# Patient Record
Sex: MALE | Race: WHITE | Employment: PART TIME | URBAN - METROPOLITAN AREA
[De-identification: names, ages, dates, MRNs, and addresses within clinical notes are randomized per-mention and may not be internally consistent; named-entity substitution may affect disease eponyms.]

---

## 2019-02-28 PROBLEM — R22.1 NECK MASS: Status: ACTIVE | Noted: 2019-02-28

## 2019-03-08 RX ORDER — SODIUM CHLORIDE 0.9 % (FLUSH) 0.9 %
5-40 SYRINGE (ML) INJECTION EVERY 8 HOURS
Status: CANCELLED | OUTPATIENT
Start: 2019-03-08

## 2019-03-08 RX ORDER — SODIUM CHLORIDE 0.9 % (FLUSH) 0.9 %
5-40 SYRINGE (ML) INJECTION AS NEEDED
Status: CANCELLED | OUTPATIENT
Start: 2019-03-08

## 2020-11-19 RX ORDER — SODIUM CHLORIDE 0.9 % (FLUSH) 0.9 %
5-40 SYRINGE (ML) INJECTION AS NEEDED
Status: CANCELLED | OUTPATIENT
Start: 2020-11-19

## 2020-11-19 RX ORDER — SODIUM CHLORIDE 0.9 % (FLUSH) 0.9 %
5-40 SYRINGE (ML) INJECTION EVERY 8 HOURS
Status: CANCELLED | OUTPATIENT
Start: 2020-11-19

## 2020-11-30 ENCOUNTER — ANESTHESIA EVENT (OUTPATIENT)
Dept: SURGERY | Age: 30
End: 2020-11-30
Payer: COMMERCIAL

## 2020-12-01 ENCOUNTER — ANESTHESIA (OUTPATIENT)
Dept: SURGERY | Age: 30
End: 2020-12-01
Payer: COMMERCIAL

## 2020-12-01 ENCOUNTER — HOSPITAL ENCOUNTER (OUTPATIENT)
Age: 30
Setting detail: OUTPATIENT SURGERY
Discharge: HOME | End: 2020-12-01
Attending: SURGERY | Admitting: SURGERY
Payer: COMMERCIAL

## 2020-12-01 VITALS
BODY MASS INDEX: 25.51 KG/M2 | WEIGHT: 172.2 LBS | SYSTOLIC BLOOD PRESSURE: 141 MMHG | HEART RATE: 76 BPM | TEMPERATURE: 97.3 F | OXYGEN SATURATION: 98 % | RESPIRATION RATE: 14 BRPM | DIASTOLIC BLOOD PRESSURE: 86 MMHG | HEIGHT: 69 IN

## 2020-12-01 DIAGNOSIS — R22.1 NECK MASS: ICD-10-CM

## 2020-12-01 PROCEDURE — 76210000020 HC REC RM PH II FIRST 0.5 HR: Performed by: SURGERY

## 2020-12-01 PROCEDURE — 74011250636 HC RX REV CODE- 250/636: Performed by: NURSE ANESTHETIST, CERTIFIED REGISTERED

## 2020-12-01 PROCEDURE — 76010000160 HC OR TIME 0.5 TO 1 HR INTENSV-TIER 1: Performed by: SURGERY

## 2020-12-01 PROCEDURE — 77030019908 HC STETH ESOPH SIMS -A: Performed by: ANESTHESIOLOGY

## 2020-12-01 PROCEDURE — 77030039425 HC BLD LARYNG TRULITE DISP TELE -A: Performed by: REGISTERED NURSE

## 2020-12-01 PROCEDURE — 74011250636 HC RX REV CODE- 250/636: Performed by: ANESTHESIOLOGY

## 2020-12-01 PROCEDURE — 76210000063 HC OR PH I REC FIRST 0.5 HR: Performed by: SURGERY

## 2020-12-01 PROCEDURE — 76060000032 HC ANESTHESIA 0.5 TO 1 HR: Performed by: SURGERY

## 2020-12-01 PROCEDURE — 74011000250 HC RX REV CODE- 250: Performed by: NURSE ANESTHETIST, CERTIFIED REGISTERED

## 2020-12-01 PROCEDURE — 88305 TISSUE EXAM BY PATHOLOGIST: CPT

## 2020-12-01 PROCEDURE — 21552 EXC NECK LES SC 3 CM/>: CPT | Performed by: SURGERY

## 2020-12-01 PROCEDURE — 74011250636 HC RX REV CODE- 250/636: Performed by: SURGERY

## 2020-12-01 PROCEDURE — 74011250636 HC RX REV CODE- 250/636: Performed by: REGISTERED NURSE

## 2020-12-01 PROCEDURE — 77030040361 HC SLV COMPR DVT MDII -B: Performed by: SURGERY

## 2020-12-01 PROCEDURE — 77030037088 HC TUBE ENDOTRACH ORAL NSL COVD-A: Performed by: REGISTERED NURSE

## 2020-12-01 PROCEDURE — 2709999900 HC NON-CHARGEABLE SUPPLY: Performed by: SURGERY

## 2020-12-01 PROCEDURE — 77030031139 HC SUT VCRL2 J&J -A: Performed by: SURGERY

## 2020-12-01 RX ORDER — FENTANYL CITRATE 50 UG/ML
100 INJECTION, SOLUTION INTRAMUSCULAR; INTRAVENOUS ONCE
Status: DISCONTINUED | OUTPATIENT
Start: 2020-12-01 | End: 2020-12-01 | Stop reason: HOSPADM

## 2020-12-01 RX ORDER — OXYCODONE HYDROCHLORIDE 5 MG/1
5 TABLET ORAL
Status: DISCONTINUED | OUTPATIENT
Start: 2020-12-01 | End: 2020-12-01 | Stop reason: HOSPADM

## 2020-12-01 RX ORDER — MIDAZOLAM HYDROCHLORIDE 1 MG/ML
2 INJECTION, SOLUTION INTRAMUSCULAR; INTRAVENOUS
Status: DISCONTINUED | OUTPATIENT
Start: 2020-12-01 | End: 2020-12-01 | Stop reason: HOSPADM

## 2020-12-01 RX ORDER — SODIUM CHLORIDE, SODIUM LACTATE, POTASSIUM CHLORIDE, CALCIUM CHLORIDE 600; 310; 30; 20 MG/100ML; MG/100ML; MG/100ML; MG/100ML
100 INJECTION, SOLUTION INTRAVENOUS CONTINUOUS
Status: DISCONTINUED | OUTPATIENT
Start: 2020-12-01 | End: 2020-12-01 | Stop reason: HOSPADM

## 2020-12-01 RX ORDER — SODIUM CHLORIDE 0.9 % (FLUSH) 0.9 %
5-40 SYRINGE (ML) INJECTION AS NEEDED
Status: DISCONTINUED | OUTPATIENT
Start: 2020-12-01 | End: 2020-12-01 | Stop reason: HOSPADM

## 2020-12-01 RX ORDER — NALOXONE HYDROCHLORIDE 0.4 MG/ML
0.1 INJECTION, SOLUTION INTRAMUSCULAR; INTRAVENOUS; SUBCUTANEOUS AS NEEDED
Status: DISCONTINUED | OUTPATIENT
Start: 2020-12-01 | End: 2020-12-01 | Stop reason: HOSPADM

## 2020-12-01 RX ORDER — ROCURONIUM BROMIDE 10 MG/ML
INJECTION, SOLUTION INTRAVENOUS AS NEEDED
Status: DISCONTINUED | OUTPATIENT
Start: 2020-12-01 | End: 2020-12-01 | Stop reason: HOSPADM

## 2020-12-01 RX ORDER — DIPHENHYDRAMINE HYDROCHLORIDE 50 MG/ML
12.5 INJECTION, SOLUTION INTRAMUSCULAR; INTRAVENOUS
Status: DISCONTINUED | OUTPATIENT
Start: 2020-12-01 | End: 2020-12-01 | Stop reason: HOSPADM

## 2020-12-01 RX ORDER — LIDOCAINE HYDROCHLORIDE 10 MG/ML
0.1 INJECTION INFILTRATION; PERINEURAL AS NEEDED
Status: DISCONTINUED | OUTPATIENT
Start: 2020-12-01 | End: 2020-12-01 | Stop reason: HOSPADM

## 2020-12-01 RX ORDER — SODIUM CHLORIDE 0.9 % (FLUSH) 0.9 %
5-40 SYRINGE (ML) INJECTION EVERY 8 HOURS
Status: DISCONTINUED | OUTPATIENT
Start: 2020-12-01 | End: 2020-12-01 | Stop reason: HOSPADM

## 2020-12-01 RX ORDER — MIDAZOLAM HYDROCHLORIDE 1 MG/ML
2 INJECTION, SOLUTION INTRAMUSCULAR; INTRAVENOUS ONCE
Status: DISCONTINUED | OUTPATIENT
Start: 2020-12-01 | End: 2020-12-01 | Stop reason: HOSPADM

## 2020-12-01 RX ORDER — HYDROMORPHONE HYDROCHLORIDE 2 MG/ML
0.5 INJECTION, SOLUTION INTRAMUSCULAR; INTRAVENOUS; SUBCUTANEOUS
Status: DISCONTINUED | OUTPATIENT
Start: 2020-12-01 | End: 2020-12-01 | Stop reason: HOSPADM

## 2020-12-01 RX ORDER — LIDOCAINE HYDROCHLORIDE 20 MG/ML
INJECTION, SOLUTION EPIDURAL; INFILTRATION; INTRACAUDAL; PERINEURAL AS NEEDED
Status: DISCONTINUED | OUTPATIENT
Start: 2020-12-01 | End: 2020-12-01 | Stop reason: HOSPADM

## 2020-12-01 RX ORDER — FENTANYL CITRATE 50 UG/ML
INJECTION, SOLUTION INTRAMUSCULAR; INTRAVENOUS AS NEEDED
Status: DISCONTINUED | OUTPATIENT
Start: 2020-12-01 | End: 2020-12-01 | Stop reason: HOSPADM

## 2020-12-01 RX ORDER — DEXAMETHASONE SODIUM PHOSPHATE 4 MG/ML
INJECTION, SOLUTION INTRA-ARTICULAR; INTRALESIONAL; INTRAMUSCULAR; INTRAVENOUS; SOFT TISSUE AS NEEDED
Status: DISCONTINUED | OUTPATIENT
Start: 2020-12-01 | End: 2020-12-01 | Stop reason: HOSPADM

## 2020-12-01 RX ORDER — ONDANSETRON 2 MG/ML
INJECTION INTRAMUSCULAR; INTRAVENOUS AS NEEDED
Status: DISCONTINUED | OUTPATIENT
Start: 2020-12-01 | End: 2020-12-01 | Stop reason: HOSPADM

## 2020-12-01 RX ORDER — ALBUTEROL SULFATE 0.83 MG/ML
2.5 SOLUTION RESPIRATORY (INHALATION) AS NEEDED
Status: DISCONTINUED | OUTPATIENT
Start: 2020-12-01 | End: 2020-12-01 | Stop reason: HOSPADM

## 2020-12-01 RX ORDER — OXYCODONE HYDROCHLORIDE 5 MG/1
10 TABLET ORAL
Status: DISCONTINUED | OUTPATIENT
Start: 2020-12-01 | End: 2020-12-01 | Stop reason: HOSPADM

## 2020-12-01 RX ORDER — ONDANSETRON 2 MG/ML
4 INJECTION INTRAMUSCULAR; INTRAVENOUS ONCE
Status: DISCONTINUED | OUTPATIENT
Start: 2020-12-01 | End: 2020-12-01 | Stop reason: HOSPADM

## 2020-12-01 RX ORDER — SUCCINYLCHOLINE CHLORIDE 20 MG/ML
INJECTION INTRAMUSCULAR; INTRAVENOUS AS NEEDED
Status: DISCONTINUED | OUTPATIENT
Start: 2020-12-01 | End: 2020-12-01 | Stop reason: HOSPADM

## 2020-12-01 RX ORDER — PROPOFOL 10 MG/ML
INJECTION, EMULSION INTRAVENOUS AS NEEDED
Status: DISCONTINUED | OUTPATIENT
Start: 2020-12-01 | End: 2020-12-01 | Stop reason: HOSPADM

## 2020-12-01 RX ORDER — CEFAZOLIN SODIUM/WATER 2 G/20 ML
2 SYRINGE (ML) INTRAVENOUS ONCE
Status: COMPLETED | OUTPATIENT
Start: 2020-12-01 | End: 2020-12-01

## 2020-12-01 RX ADMIN — Medication 2 G: at 09:56

## 2020-12-01 RX ADMIN — PROPOFOL 200 MG: 10 INJECTION, EMULSION INTRAVENOUS at 09:43

## 2020-12-01 RX ADMIN — SUCCINYLCHOLINE CHLORIDE 170 MG: 20 INJECTION, SOLUTION INTRAMUSCULAR; INTRAVENOUS at 09:44

## 2020-12-01 RX ADMIN — SODIUM CHLORIDE, SODIUM LACTATE, POTASSIUM CHLORIDE, AND CALCIUM CHLORIDE 100 ML/HR: 600; 310; 30; 20 INJECTION, SOLUTION INTRAVENOUS at 08:33

## 2020-12-01 RX ADMIN — ROCURONIUM BROMIDE 5 MG: 10 INJECTION, SOLUTION INTRAVENOUS at 09:43

## 2020-12-01 RX ADMIN — FENTANYL CITRATE 100 MCG: 50 INJECTION INTRAMUSCULAR; INTRAVENOUS at 09:43

## 2020-12-01 RX ADMIN — ONDANSETRON 4 MG: 2 INJECTION INTRAMUSCULAR; INTRAVENOUS at 10:10

## 2020-12-01 RX ADMIN — DEXAMETHASONE SODIUM PHOSPHATE 4 MG: 4 INJECTION, SOLUTION INTRAMUSCULAR; INTRAVENOUS at 09:58

## 2020-12-01 RX ADMIN — LIDOCAINE HYDROCHLORIDE 50 MG: 20 INJECTION, SOLUTION EPIDURAL; INFILTRATION; INTRACAUDAL; PERINEURAL at 09:43

## 2020-12-01 NOTE — DISCHARGE INSTRUCTIONS
Leave your incisional dressing alone until follow-up visit. Try to keep incision as dry as possible to lower risk of infection. No driving until you are off pain meds for 24hrs and have no pain with movements associated with driving. Pain prescription (Norco) on chart for patient to use as needed for pain  Follow-up with Dr Kannan Lewis nurse practitioner Eda Bassett NP or Bill Woodruff NP) in 9 days on a Thursday. Then see Dr Sabrina Sellers as needed after that visit. [address], [address]  (Call for an appt time unless one is already made for you by discharge nurse which is preferred -->[phone]-->option 1)      Soft Tissue Mass or Cyst Excision    Activity  · As tolerated and as directed by your doctor. · Bathe or shower as directed by your doctor    Diet  · Clear liquids until no nausea or vomiting; then light diet for hte first day. · Advance to regular diet on the second day unless your doctor orders otherwise. · If nausea and vomiting continue, call your doctor. Pain  · Take pain medication as directed by your doctor. · Call your doctor if your pain is NOT relieved by medication. · DO NOT take aspirin or blood thinners until directed by your doctor. If you have any problems or concerns, call your doctor as needed or if you experience;  · Excessive bleeding that does not stop after holding mild pressure over the area for several minutes. · Temperature of 101 degrees Fahrenheit or above. · Redness, excessive swelling or bruising, and/or green or yellow, smelly discharge from incision.     After general anesthesia or intravenous sedation, for 24 hours or while taking prescription Narcotics:  · Limit your activities  · A responsible adult needs to be with you for the next 24 hours  · Do not drive and operate hazardous machinery  · Do not make important personal or business decisions  · Do not drink alcoholic beverages  · If you have not urinated within 8 hours after discharge, and you are experiencing discomfort from urinary retention, please go to the nearest ED. · If you have sleep apnea and have a CPAP machine, please use it for all naps and sleeping. · Please use caution when taking narcotics and any of your home medications that may cause drowsiness. *  Please give a list of your current medications to your Primary Care Provider. *  Please update this list whenever your medications are discontinued, doses are      changed, or new medications (including over-the-counter products) are added. *  Please carry medication information at all times in case of emergency situations. These are general instructions for a healthy lifestyle:  No smoking/ No tobacco products/ Avoid exposure to second hand smoke  Surgeon General's Warning:  Quitting smoking now greatly reduces serious risk to your health. Obesity, smoking, and sedentary lifestyle greatly increases your risk for illness  A healthy diet, regular physical exercise & weight monitoring are important for maintaining a healthy lifestyle    You may be retaining fluid if you have a history of heart failure or if you experience any of the following symptoms:  Weight gain of 3 pounds or more overnight or 5 pounds in a week, increased swelling in our hands or feet or shortness of breath while lying flat in bed. Please call your doctor as soon as you notice any of these symptoms; do not wait until your next office visit. After general anesthesia or intravenous sedation, for 24 hours or while taking prescription Narcotics:  · Limit your activities  · A responsible adult needs to be with you for the next 24 hours  · Do not drive and operate hazardous machinery  · Do not make important personal or business decisions  · Do not drink alcoholic beverages  · If you have not urinated within 8 hours after discharge, and you are experiencing discomfort from urinary retention, please go to the nearest ED.   · If you have sleep apnea and have a CPAP machine, please use it for all naps and sleeping. · Please use caution when taking narcotics and any of your home medications that may cause drowsiness. *  Please give a list of your current medications to your Primary Care Provider. *  Please update this list whenever your medications are discontinued, doses are      changed, or new medications (including over-the-counter products) are added. *  Please carry medication information at all times in case of emergency situations. These are general instructions for a healthy lifestyle:  No smoking/ No tobacco products/ Avoid exposure to second hand smoke  Surgeon General's Warning:  Quitting smoking now greatly reduces serious risk to your health. Obesity, smoking, and sedentary lifestyle greatly increases your risk for illness  A healthy diet, regular physical exercise & weight monitoring are important for maintaining a healthy lifestyle    You may be retaining fluid if you have a history of heart failure or if you experience any of the following symptoms:  Weight gain of 3 pounds or more overnight or 5 pounds in a week, increased swelling in our hands or feet or shortness of breath while lying flat in bed. Please call your doctor as soon as you notice any of these symptoms; do not wait until your next office visit.

## 2020-12-01 NOTE — H&P
H&P/Consult Note/Progress Note/Office Note:   Nati Medina  MRN: [id number]  :1990  Age:29 y.o.    HPI: Nati Medina is a 34 y.o. male who is here for excision of a posterior soft tissue neck mass on 20. Prior to surgery he was referred in consultation by Dr. Fidencio Hallman for evaluation of neck mass. He reported a 3+yr h/o of a progressively enlarging soft tissue mass involving his posterior neck. He wanted it removed. No prior attempts to excise it. No history of malignancy. No skin infections or skin drainage. No associated fevers. Nothing seemed to make the mass bigger or smaller. I saw him previously in 2019 and offered excision of the mass but he did not follow-up with scheduling. Past Medical History:   Diagnosis Date    Anxiety     Mass of neck     posterior     No past surgical history on file. Current Facility-Administered Medications   Medication Dose Route Frequency    lidocaine (XYLOCAINE) 10 mg/mL (1 %) injection 0.1 mL  0.1 mL SubCUTAneous PRN    lactated Ringers infusion  100 mL/hr IntraVENous CONTINUOUS    fentaNYL citrate (PF) injection 100 mcg  100 mcg IntraVENous ONCE    midazolam (VERSED) injection 2 mg  2 mg IntraVENous ONCE PRN    midazolam (VERSED) injection 2 mg  2 mg IntraVENous ONCE    ceFAZolin (ANCEF) 2 g/20 mL in sterile water IV syringe  2 g IntraVENous ONCE    sodium chloride (NS) flush 5-40 mL  5-40 mL IntraVENous Q8H    sodium chloride (NS) flush 5-40 mL  5-40 mL IntraVENous PRN     Hydrocodone and Morphine  Social History     Socioeconomic History    Marital status: SINGLE     Spouse name: Not on file    Number of children: Not on file    Years of education: Not on file    Highest education level: Not on file   Occupational History    Occupation: construction. Tobacco Use    Smoking status: Never Smoker    Smokeless tobacco: Former User   Substance and Sexual Activity    Alcohol use:  Yes Comment: occ    Drug use: Yes     Types: Marijuana     Comment: last used 1 month ago     Social History     Tobacco Use   Smoking Status Never Smoker   Smokeless Tobacco Former User     Family History   Problem Relation Age of Onset    Diabetes Father     Hypertension Father     Alcohol abuse Father     Alzheimer Paternal Grandmother     Stroke Paternal Grandfather     No Known Problems Sister      ROS: The patient has no difficulty with chest pain or shortness of breath. No fever or chills. Comprehensive review of systems was otherwise unremarkable except as noted above. Physical Exam:   Visit Vitals  /84 (BP 1 Location: Right arm, BP Patient Position: At rest)   Pulse 78   Temp 97.9 °F (36.6 °C)   Resp 15   Ht 5' 8.5\" (1.74 m)   Wt 172 lb 3.2 oz (78.1 kg)   SpO2 100%   BMI 25.80 kg/m²     Vitals:    12/01/20 0820 12/01/20 0827   BP:  128/84   Pulse:  78   Resp:  15   Temp:  97.9 °F (36.6 °C)   SpO2:  100%   Weight: 172 lb 3.2 oz (78.1 kg)    Height: 5' 8.5\" (1.74 m)      [unfilled]  [unfilled]    Constitutional: Alert, oriented, cooperative patient in no acute distress; appears stated age    Eyes:Sclera are clear. EOMs intact  ENMT: no external lesions gross hearing normal; no obvious neck masses, no ear or lip lesions, nares normal    He has an approximately 3 cm soft tissue mass involving the posterior neck. There is no surrounding satellite nodularity. No regional adenopathy. No breast masses. CV: RRR. Normal perfusion  Resp: No JVD. Breathing is  non-labored; no audible wheezing. GI: soft and non-distended     Musculoskeletal: unremarkable with normal function. No embolic signs or cyanosis.    Neuro:  Oriented; moves all 4; no focal deficits  Psychiatric: normal affect and mood, no memory impairment    Recent vitals (if inpt):  @IPVITALS(24:)@    Labs:  No results for input(s): WBC, HGB, PLT, NA, K, CL, CO2, BUN, CREA, GLU, PTP, INR, APTT, TBIL, TBILI, CBIL, ALT, AP, AML, AML, LPSE, LCAD, NH4, TROPT, TROIQ, PCO2, PO2, HCO3, HGBEXT, PLTEXT, INREXT, HGBEXT, PLTEXT, INREXT in the last 72 hours. No lab exists for component: SGOT, GPT,  PH    No results found for: WBC, HGB, PLT, NA, K, CL, CO2, BUN, CREA, GLU, INR, APTT, TBIL, TBILI, CBIL, ALT, AP, AML, AML, LPSE, LCAD, NH4, TROPT, TROIQ, HGBEXT, PLTEXT, INREXT, HGBEXT, PLTEXT, INREXT    CT Results  (Last 48 hours)    None        chest X-ray      I reviewed recent labs, recent radiologic studies, and pertinent records including other doctor notes if needed. I independently reviewed radiology images for studies I described above or studies I have ordered. Admission date (for inpatients): 12/1/2020   [unfilled]  [unfilled]    ASSESSMENT/PLAN:  Problem List  Date Reviewed: 11/18/2020          Codes Class Noted    Posterior Neck mass ICD-10-CM: R22.1  ICD-9-CM: 784.2  2/28/2019        Anxiety ICD-10-CM: F41.9  ICD-9-CM: 300.00  Unknown            Active Problems:    * No active hospital problems. *         Neck mass  He is here for excision of a posterior neck soft tissue mass. We will send the tissue to pathology for evaluation. Informed consent was obtained. He reports hydrocodone causes itching but he tolerates oxycodone well. I have personally performed a face-to-face diagnostic evaluation and management  service on this patient. I have independently seen the patient. I have independently obtained the above history from the patient/family. I have independently examined the patient with above findings. I have independently reviewed data/labs for this patient and developed the above plan of care (MDM). Signed: Romie Elias.  Jyoti Beaver MD, FACS    CC: Dr Viktoria Morton

## 2020-12-01 NOTE — ANESTHESIA PREPROCEDURE EVALUATION
Relevant Problems   No relevant active problems       Anesthetic History   No history of anesthetic complications            Review of Systems / Medical History  Patient summary reviewed, nursing notes reviewed and pertinent labs reviewed    Pulmonary  Within defined limits                 Neuro/Psych   Within defined limits           Cardiovascular  Within defined limits                Exercise tolerance: >4 METS     GI/Hepatic/Renal  Within defined limits              Endo/Other  Within defined limits           Other Findings              Physical Exam    Airway  Mallampati: II  TM Distance: 4 - 6 cm  Neck ROM: normal range of motion   Mouth opening: Normal     Cardiovascular  Regular rate and rhythm,  S1 and S2 normal,  no murmur, click, rub, or gallop             Dental  No notable dental hx       Pulmonary  Breath sounds clear to auscultation               Abdominal         Other Findings            Anesthetic Plan    ASA: 2  Anesthesia type: general          Induction: Intravenous  Anesthetic plan and risks discussed with: Patient and Spouse

## 2020-12-01 NOTE — ANESTHESIA POSTPROCEDURE EVALUATION
Procedure(s):  EXCISION OF NECK MASS. general    Anesthesia Post Evaluation      Multimodal analgesia: multimodal analgesia used between 6 hours prior to anesthesia start to PACU discharge  Patient location during evaluation: PACU  Patient participation: complete - patient participated  Level of consciousness: awake and awake and alert  Pain management: adequate  Airway patency: patent  Anesthetic complications: no  Cardiovascular status: acceptable  Respiratory status: acceptable  Hydration status: acceptable  Post anesthesia nausea and vomiting:  controlled      INITIAL Post-op Vital signs:   Vitals Value Taken Time   /86 12/1/2020 11:00 AM   Temp 36.3 °C (97.4 °F) 12/1/2020 10:31 AM   Pulse 76 12/1/2020 11:01 AM   Resp 14 12/1/2020 10:45 AM   SpO2 98 % 12/1/2020 11:01 AM   Vitals shown include unvalidated device data.

## 2020-12-01 NOTE — OP NOTES
[facility]  OPERATIVE REPORT    Name:  Anuj Curtis  MR#:  [id number]  :  1990  ACCOUNT #:  [de-identified]  DATE OF SERVICE:  2020    PREOPERATIVE DIAGNOSIS:  Posterior subcutaneous soft tissue neck mass. POSTOPERATIVE DIAGNOSIS:  Posterior subcutaneous soft tissue neck mass. PROCEDURE PERFORMED:  Excision of 3.5-cm subcutaneous soft tissue mass of the posterior neck (CPT 51447). SURGEON:  Nalini Rothman MD    ASSISTANT:  None. ANESTHESIA:  General.    COMPLICATIONS:  None. SPECIMENS REMOVED:  Sent to Pathology for review. IMPLANTS:  None. ESTIMATED BLOOD LOSS:  Less than 5 mL. PROCEDURE:  The patient was taken to the operating room and placed in supine position. After adequate anesthesia was given, he was repositioned in prone. His posterior neck was prepped and draped in a sterile fashion with multiple layers of Betadine scrub and Betadine solution. Time-out protocol was followed. He was given prophylactic antibiotics. A transverse incision was made with a #15 scalpel and we dissected into the subcutaneous soft tissues down to the anterior aspect of the mass. We did create a branden-shaped skin incision in the transverse direction which we excised in continuity with the subcutaneous mass in case there were any tracks to the skin. We circumferentially excised the 3.5-cm subcutaneous soft tissue mass with the subcutaneous tissues. It was completely removed. It was 3.5 cm in size. It was sent to Pathology for review. Irrigation was used. Hemostasis was confirmed. Local anesthetic was given. There was no evidence of gross or palpable disease remaining. The incision was closed with interrupted deep dermal 3-0 Vicryl sutures. The skin was closed with clips. Sterile dressing was placed consisting of 4 x 4s and Tegaderm. The patient tolerated the procedure well. There were no immediate complications.       [facility] MD [email or web address]/  D:  12/01/2020 10:31  T:  12/01/2020 18:26  JOB #:  [id number]

## 2022-03-19 PROBLEM — R22.1 NECK MASS: Status: ACTIVE | Noted: 2019-02-28

## 2023-11-19 ENCOUNTER — HOSPITAL ENCOUNTER (OUTPATIENT)
Age: 33
Setting detail: OBSERVATION
Discharge: HOME | End: 2023-11-21
Attending: EMERGENCY MEDICINE | Admitting: SURGERY
Payer: COMMERCIAL

## 2023-11-19 DIAGNOSIS — K35.200 ACUTE APPENDICITIS WITH GENERALIZED PERITONITIS WITHOUT GANGRENE, PERFORATION, OR ABSCESS: Primary | ICD-10-CM

## 2023-11-19 LAB
ALBUMIN SERPL-MCNC: 4.5 G/DL (ref 3.5–5)
ALBUMIN/GLOB SERPL: 1.2 (ref 0.4–1.6)
ALP SERPL-CCNC: 70 U/L (ref 50–136)
ALT SERPL-CCNC: 22 U/L (ref 12–65)
ANION GAP SERPL CALC-SCNC: 7 MMOL/L (ref 2–11)
AST SERPL-CCNC: 11 U/L (ref 15–37)
BASOPHILS # BLD: 0 K/UL (ref 0–0.2)
BASOPHILS NFR BLD: 0 % (ref 0–2)
BILIRUB SERPL-MCNC: 1.3 MG/DL (ref 0.2–1.1)
BUN SERPL-MCNC: 11 MG/DL (ref 6–23)
CALCIUM SERPL-MCNC: 9.1 MG/DL (ref 8.3–10.4)
CHLORIDE SERPL-SCNC: 102 MMOL/L (ref 101–110)
CO2 SERPL-SCNC: 28 MMOL/L (ref 21–32)
CREAT SERPL-MCNC: 1 MG/DL (ref 0.8–1.5)
DIFFERENTIAL METHOD BLD: ABNORMAL
EOSINOPHIL # BLD: 0 K/UL (ref 0–0.8)
EOSINOPHIL NFR BLD: 0 % (ref 0.5–7.8)
ERYTHROCYTE [DISTWIDTH] IN BLOOD BY AUTOMATED COUNT: 11.7 % (ref 11.9–14.6)
GLOBULIN SER CALC-MCNC: 3.7 G/DL (ref 2.8–4.5)
GLUCOSE SERPL-MCNC: 146 MG/DL (ref 65–100)
HCT VFR BLD AUTO: 47.3 % (ref 41.1–50.3)
HGB BLD-MCNC: 16.3 G/DL (ref 13.6–17.2)
IMM GRANULOCYTES # BLD AUTO: 0.1 K/UL (ref 0–0.5)
IMM GRANULOCYTES NFR BLD AUTO: 0 % (ref 0–5)
LIPASE SERPL-CCNC: 41 U/L (ref 73–393)
LYMPHOCYTES # BLD: 0.5 K/UL (ref 0.5–4.6)
LYMPHOCYTES NFR BLD: 4 % (ref 13–44)
MCH RBC QN AUTO: 29.3 PG (ref 26.1–32.9)
MCHC RBC AUTO-ENTMCNC: 34.5 G/DL (ref 31.4–35)
MCV RBC AUTO: 84.9 FL (ref 82–102)
MONOCYTES # BLD: 0.8 K/UL (ref 0.1–1.3)
MONOCYTES NFR BLD: 6 % (ref 4–12)
NEUTS SEG # BLD: 12 K/UL (ref 1.7–8.2)
NEUTS SEG NFR BLD: 90 % (ref 43–78)
NRBC # BLD: 0 K/UL (ref 0–0.2)
PLATELET # BLD AUTO: 262 K/UL (ref 150–450)
PMV BLD AUTO: 9.8 FL (ref 9.4–12.3)
POTASSIUM SERPL-SCNC: 3.1 MMOL/L (ref 3.5–5.1)
PROT SERPL-MCNC: 8.2 G/DL (ref 6.3–8.2)
RBC # BLD AUTO: 5.57 M/UL (ref 4.23–5.6)
SODIUM SERPL-SCNC: 137 MMOL/L (ref 133–143)
WBC # BLD AUTO: 13.4 K/UL (ref 4.3–11.1)

## 2023-11-19 PROCEDURE — 83690 ASSAY OF LIPASE: CPT

## 2023-11-19 PROCEDURE — 80053 COMPREHEN METABOLIC PANEL: CPT

## 2023-11-19 PROCEDURE — 85025 COMPLETE CBC W/AUTO DIFF WBC: CPT

## 2023-11-19 PROCEDURE — 99285 EMERGENCY DEPT VISIT HI MDM: CPT

## 2023-11-19 RX ORDER — DROPERIDOL 2.5 MG/ML
0.62 INJECTION, SOLUTION INTRAMUSCULAR; INTRAVENOUS ONCE
Status: COMPLETED | OUTPATIENT
Start: 2023-11-19 | End: 2023-11-20

## 2023-11-19 ASSESSMENT — PAIN DESCRIPTION - PAIN TYPE: TYPE: ACUTE PAIN

## 2023-11-19 ASSESSMENT — PAIN DESCRIPTION - DESCRIPTORS: DESCRIPTORS: PRESSURE;SQUEEZING;STABBING

## 2023-11-19 ASSESSMENT — LIFESTYLE VARIABLES
HOW MANY STANDARD DRINKS CONTAINING ALCOHOL DO YOU HAVE ON A TYPICAL DAY: 1 OR 2
HOW OFTEN DO YOU HAVE A DRINK CONTAINING ALCOHOL: 4 OR MORE TIMES A WEEK

## 2023-11-19 ASSESSMENT — PAIN - FUNCTIONAL ASSESSMENT: PAIN_FUNCTIONAL_ASSESSMENT: 0-10

## 2023-11-19 ASSESSMENT — PAIN DESCRIPTION - LOCATION: LOCATION: ABDOMEN

## 2023-11-19 ASSESSMENT — PAIN DESCRIPTION - ORIENTATION: ORIENTATION: LOWER

## 2023-11-19 ASSESSMENT — PAIN SCALES - GENERAL: PAINLEVEL_OUTOF10: 0

## 2023-11-20 ENCOUNTER — ANESTHESIA (OUTPATIENT)
Dept: SURGERY | Age: 33
End: 2023-11-20
Payer: COMMERCIAL

## 2023-11-20 ENCOUNTER — APPOINTMENT (OUTPATIENT)
Dept: CT IMAGING | Age: 33
End: 2023-11-20
Payer: COMMERCIAL

## 2023-11-20 ENCOUNTER — ANESTHESIA EVENT (OUTPATIENT)
Dept: SURGERY | Age: 33
End: 2023-11-20
Payer: COMMERCIAL

## 2023-11-20 PROBLEM — K35.80 ACUTE APPENDICITIS: Status: ACTIVE | Noted: 2023-11-20

## 2023-11-20 PROCEDURE — 2580000003 HC RX 258: Performed by: SURGERY

## 2023-11-20 PROCEDURE — 74177 CT ABD & PELVIS W/CONTRAST: CPT

## 2023-11-20 PROCEDURE — 96365 THER/PROPH/DIAG IV INF INIT: CPT

## 2023-11-20 PROCEDURE — 3700000000 HC ANESTHESIA ATTENDED CARE: Performed by: SURGERY

## 2023-11-20 PROCEDURE — 2500000003 HC RX 250 WO HCPCS: Performed by: NURSE ANESTHETIST, CERTIFIED REGISTERED

## 2023-11-20 PROCEDURE — 3700000001 HC ADD 15 MINUTES (ANESTHESIA): Performed by: SURGERY

## 2023-11-20 PROCEDURE — 6360000002 HC RX W HCPCS: Performed by: SURGERY

## 2023-11-20 PROCEDURE — 6360000002 HC RX W HCPCS

## 2023-11-20 PROCEDURE — 3600000003 HC SURGERY LEVEL 3 BASE: Performed by: SURGERY

## 2023-11-20 PROCEDURE — 2580000003 HC RX 258: Performed by: NURSE PRACTITIONER

## 2023-11-20 PROCEDURE — 6360000004 HC RX CONTRAST MEDICATION: Performed by: EMERGENCY MEDICINE

## 2023-11-20 PROCEDURE — 88304 TISSUE EXAM BY PATHOLOGIST: CPT

## 2023-11-20 PROCEDURE — 2580000003 HC RX 258: Performed by: EMERGENCY MEDICINE

## 2023-11-20 PROCEDURE — 2500000003 HC RX 250 WO HCPCS

## 2023-11-20 PROCEDURE — 44970 LAPAROSCOPY APPENDECTOMY: CPT | Performed by: SURGERY

## 2023-11-20 PROCEDURE — 2580000003 HC RX 258

## 2023-11-20 PROCEDURE — 6360000002 HC RX W HCPCS: Performed by: NURSE ANESTHETIST, CERTIFIED REGISTERED

## 2023-11-20 PROCEDURE — 6360000002 HC RX W HCPCS: Performed by: NURSE PRACTITIONER

## 2023-11-20 PROCEDURE — 7100000001 HC PACU RECOVERY - ADDTL 15 MIN: Performed by: SURGERY

## 2023-11-20 PROCEDURE — 7100000000 HC PACU RECOVERY - FIRST 15 MIN: Performed by: SURGERY

## 2023-11-20 PROCEDURE — G0378 HOSPITAL OBSERVATION PER HR: HCPCS

## 2023-11-20 PROCEDURE — 2720000010 HC SURG SUPPLY STERILE: Performed by: SURGERY

## 2023-11-20 PROCEDURE — 3600000013 HC SURGERY LEVEL 3 ADDTL 15MIN: Performed by: SURGERY

## 2023-11-20 PROCEDURE — 96375 TX/PRO/DX INJ NEW DRUG ADDON: CPT

## 2023-11-20 PROCEDURE — 2580000003 HC RX 258: Performed by: ANESTHESIOLOGY

## 2023-11-20 PROCEDURE — 6370000000 HC RX 637 (ALT 250 FOR IP): Performed by: NURSE PRACTITIONER

## 2023-11-20 PROCEDURE — 6360000002 HC RX W HCPCS: Performed by: EMERGENCY MEDICINE

## 2023-11-20 PROCEDURE — 6370000000 HC RX 637 (ALT 250 FOR IP)

## 2023-11-20 PROCEDURE — A4216 STERILE WATER/SALINE, 10 ML: HCPCS | Performed by: NURSE PRACTITIONER

## 2023-11-20 PROCEDURE — 2709999900 HC NON-CHARGEABLE SUPPLY: Performed by: SURGERY

## 2023-11-20 PROCEDURE — 2500000003 HC RX 250 WO HCPCS: Performed by: NURSE PRACTITIONER

## 2023-11-20 RX ORDER — OXYCODONE HYDROCHLORIDE 5 MG/1
5 TABLET ORAL
Status: DISCONTINUED | OUTPATIENT
Start: 2023-11-20 | End: 2023-11-20

## 2023-11-20 RX ORDER — LIDOCAINE HYDROCHLORIDE 10 MG/ML
1 INJECTION, SOLUTION INFILTRATION; PERINEURAL
Status: DISCONTINUED | OUTPATIENT
Start: 2023-11-20 | End: 2023-11-20

## 2023-11-20 RX ORDER — ONDANSETRON 2 MG/ML
INJECTION INTRAMUSCULAR; INTRAVENOUS PRN
Status: DISCONTINUED | OUTPATIENT
Start: 2023-11-20 | End: 2023-11-20 | Stop reason: SDUPTHER

## 2023-11-20 RX ORDER — SODIUM CHLORIDE, SODIUM LACTATE, POTASSIUM CHLORIDE, CALCIUM CHLORIDE 600; 310; 30; 20 MG/100ML; MG/100ML; MG/100ML; MG/100ML
INJECTION, SOLUTION INTRAVENOUS CONTINUOUS
Status: DISCONTINUED | OUTPATIENT
Start: 2023-11-21 | End: 2023-11-21

## 2023-11-20 RX ORDER — SODIUM CHLORIDE, SODIUM LACTATE, POTASSIUM CHLORIDE, CALCIUM CHLORIDE 600; 310; 30; 20 MG/100ML; MG/100ML; MG/100ML; MG/100ML
INJECTION, SOLUTION INTRAVENOUS CONTINUOUS
Status: DISCONTINUED | OUTPATIENT
Start: 2023-11-20 | End: 2023-11-20

## 2023-11-20 RX ORDER — ACETAMINOPHEN 325 MG/1
650 TABLET ORAL EVERY 6 HOURS PRN
Status: DISCONTINUED | OUTPATIENT
Start: 2023-11-20 | End: 2023-11-21 | Stop reason: HOSPADM

## 2023-11-20 RX ORDER — FAMOTIDINE 20 MG/1
20 TABLET, FILM COATED ORAL 2 TIMES DAILY
Status: DISCONTINUED | OUTPATIENT
Start: 2023-11-20 | End: 2023-11-21 | Stop reason: HOSPADM

## 2023-11-20 RX ORDER — ONDANSETRON 2 MG/ML
4 INJECTION INTRAMUSCULAR; INTRAVENOUS
Status: DISCONTINUED | OUTPATIENT
Start: 2023-11-20 | End: 2023-11-20

## 2023-11-20 RX ORDER — ACETAMINOPHEN 500 MG
1000 TABLET ORAL ONCE
Status: DISCONTINUED | OUTPATIENT
Start: 2023-11-20 | End: 2023-11-20

## 2023-11-20 RX ORDER — KETOROLAC TROMETHAMINE 30 MG/ML
30 INJECTION, SOLUTION INTRAMUSCULAR; INTRAVENOUS EVERY 6 HOURS PRN
Status: DISCONTINUED | OUTPATIENT
Start: 2023-11-20 | End: 2023-11-20

## 2023-11-20 RX ORDER — BUPIVACAINE HYDROCHLORIDE 2.5 MG/ML
INJECTION, SOLUTION EPIDURAL; INFILTRATION; INTRACAUDAL PRN
Status: DISCONTINUED | OUTPATIENT
Start: 2023-11-20 | End: 2023-11-20 | Stop reason: ALTCHOICE

## 2023-11-20 RX ORDER — SODIUM CHLORIDE 9 MG/ML
INJECTION, SOLUTION INTRAVENOUS PRN
Status: DISCONTINUED | OUTPATIENT
Start: 2023-11-20 | End: 2023-11-20

## 2023-11-20 RX ORDER — SODIUM CHLORIDE 0.9 % (FLUSH) 0.9 %
5-40 SYRINGE (ML) INJECTION PRN
Status: DISCONTINUED | OUTPATIENT
Start: 2023-11-20 | End: 2023-11-20

## 2023-11-20 RX ORDER — ONDANSETRON 2 MG/ML
4 INJECTION INTRAMUSCULAR; INTRAVENOUS EVERY 6 HOURS PRN
Status: DISCONTINUED | OUTPATIENT
Start: 2023-11-20 | End: 2023-11-21 | Stop reason: HOSPADM

## 2023-11-20 RX ORDER — HYDROMORPHONE HYDROCHLORIDE 2 MG/ML
0.5 INJECTION, SOLUTION INTRAMUSCULAR; INTRAVENOUS; SUBCUTANEOUS EVERY 5 MIN PRN
Status: DISCONTINUED | OUTPATIENT
Start: 2023-11-20 | End: 2023-11-20

## 2023-11-20 RX ORDER — MIDAZOLAM HYDROCHLORIDE 1 MG/ML
INJECTION INTRAMUSCULAR; INTRAVENOUS PRN
Status: DISCONTINUED | OUTPATIENT
Start: 2023-11-20 | End: 2023-11-20 | Stop reason: SDUPTHER

## 2023-11-20 RX ORDER — ROCURONIUM BROMIDE 10 MG/ML
INJECTION, SOLUTION INTRAVENOUS PRN
Status: DISCONTINUED | OUTPATIENT
Start: 2023-11-20 | End: 2023-11-20 | Stop reason: SDUPTHER

## 2023-11-20 RX ORDER — OXYCODONE HYDROCHLORIDE 5 MG/1
5 TABLET ORAL EVERY 4 HOURS PRN
Status: DISCONTINUED | OUTPATIENT
Start: 2023-11-20 | End: 2023-11-21 | Stop reason: HOSPADM

## 2023-11-20 RX ORDER — ACETAMINOPHEN 650 MG/1
650 SUPPOSITORY RECTAL EVERY 6 HOURS PRN
Status: DISCONTINUED | OUTPATIENT
Start: 2023-11-20 | End: 2023-11-21 | Stop reason: HOSPADM

## 2023-11-20 RX ORDER — IBUPROFEN 400 MG/1
400 TABLET ORAL
Status: DISCONTINUED | OUTPATIENT
Start: 2023-11-21 | End: 2023-11-21 | Stop reason: HOSPADM

## 2023-11-20 RX ORDER — SODIUM CHLORIDE 0.9 % (FLUSH) 0.9 %
5-40 SYRINGE (ML) INJECTION EVERY 12 HOURS SCHEDULED
Status: DISCONTINUED | OUTPATIENT
Start: 2023-11-20 | End: 2023-11-21 | Stop reason: HOSPADM

## 2023-11-20 RX ORDER — POTASSIUM CHLORIDE 20 MEQ/1
40 TABLET, EXTENDED RELEASE ORAL PRN
Status: DISCONTINUED | OUTPATIENT
Start: 2023-11-20 | End: 2023-11-21 | Stop reason: HOSPADM

## 2023-11-20 RX ORDER — MAGNESIUM SULFATE IN WATER 40 MG/ML
2000 INJECTION, SOLUTION INTRAVENOUS PRN
Status: DISCONTINUED | OUTPATIENT
Start: 2023-11-20 | End: 2023-11-21 | Stop reason: HOSPADM

## 2023-11-20 RX ORDER — SODIUM CHLORIDE 0.9 % (FLUSH) 0.9 %
5-40 SYRINGE (ML) INJECTION EVERY 12 HOURS SCHEDULED
Status: DISCONTINUED | OUTPATIENT
Start: 2023-11-20 | End: 2023-11-20

## 2023-11-20 RX ORDER — SODIUM CHLORIDE 0.9 % (FLUSH) 0.9 %
5-40 SYRINGE (ML) INJECTION PRN
Status: DISCONTINUED | OUTPATIENT
Start: 2023-11-20 | End: 2023-11-21 | Stop reason: HOSPADM

## 2023-11-20 RX ORDER — HYDROMORPHONE HYDROCHLORIDE 1 MG/ML
0.5 INJECTION, SOLUTION INTRAMUSCULAR; INTRAVENOUS; SUBCUTANEOUS EVERY 4 HOURS PRN
Status: DISCONTINUED | OUTPATIENT
Start: 2023-11-20 | End: 2023-11-21 | Stop reason: HOSPADM

## 2023-11-20 RX ORDER — DEXAMETHASONE SODIUM PHOSPHATE 4 MG/ML
INJECTION, SOLUTION INTRA-ARTICULAR; INTRALESIONAL; INTRAMUSCULAR; INTRAVENOUS; SOFT TISSUE PRN
Status: DISCONTINUED | OUTPATIENT
Start: 2023-11-20 | End: 2023-11-20 | Stop reason: SDUPTHER

## 2023-11-20 RX ORDER — NEOSTIGMINE METHYLSULFATE 1 MG/ML
INJECTION, SOLUTION INTRAVENOUS PRN
Status: DISCONTINUED | OUTPATIENT
Start: 2023-11-20 | End: 2023-11-20 | Stop reason: SDUPTHER

## 2023-11-20 RX ORDER — ONDANSETRON 4 MG/1
4 TABLET, ORALLY DISINTEGRATING ORAL EVERY 8 HOURS PRN
Status: DISCONTINUED | OUTPATIENT
Start: 2023-11-20 | End: 2023-11-21 | Stop reason: HOSPADM

## 2023-11-20 RX ORDER — TAMSULOSIN HYDROCHLORIDE 0.4 MG/1
0.4 CAPSULE ORAL DAILY
Status: DISCONTINUED | OUTPATIENT
Start: 2023-11-20 | End: 2023-11-21 | Stop reason: HOSPADM

## 2023-11-20 RX ORDER — SODIUM CHLORIDE 9 MG/ML
INJECTION, SOLUTION INTRAVENOUS CONTINUOUS
Status: DISCONTINUED | OUTPATIENT
Start: 2023-11-20 | End: 2023-11-20

## 2023-11-20 RX ORDER — OXYCODONE HYDROCHLORIDE 5 MG/1
5 TABLET ORAL EVERY 6 HOURS PRN
Status: DISCONTINUED | OUTPATIENT
Start: 2023-11-20 | End: 2023-11-21 | Stop reason: HOSPADM

## 2023-11-20 RX ORDER — PROPOFOL 10 MG/ML
INJECTION, EMULSION INTRAVENOUS PRN
Status: DISCONTINUED | OUTPATIENT
Start: 2023-11-20 | End: 2023-11-20 | Stop reason: SDUPTHER

## 2023-11-20 RX ORDER — POTASSIUM CHLORIDE 7.45 MG/ML
10 INJECTION INTRAVENOUS
Status: DISCONTINUED | OUTPATIENT
Start: 2023-11-20 | End: 2023-11-20

## 2023-11-20 RX ORDER — 0.9 % SODIUM CHLORIDE 0.9 %
1000 INTRAVENOUS SOLUTION INTRAVENOUS ONCE
Status: COMPLETED | OUTPATIENT
Start: 2023-11-20 | End: 2023-11-20

## 2023-11-20 RX ORDER — POTASSIUM CHLORIDE 7.45 MG/ML
10 INJECTION INTRAVENOUS PRN
Status: DISCONTINUED | OUTPATIENT
Start: 2023-11-20 | End: 2023-11-21 | Stop reason: HOSPADM

## 2023-11-20 RX ORDER — GLYCOPYRROLATE 0.2 MG/ML
INJECTION INTRAMUSCULAR; INTRAVENOUS PRN
Status: DISCONTINUED | OUTPATIENT
Start: 2023-11-20 | End: 2023-11-20 | Stop reason: SDUPTHER

## 2023-11-20 RX ORDER — PROCHLORPERAZINE EDISYLATE 5 MG/ML
5 INJECTION INTRAMUSCULAR; INTRAVENOUS
Status: DISCONTINUED | OUTPATIENT
Start: 2023-11-20 | End: 2023-11-20

## 2023-11-20 RX ORDER — SODIUM CHLORIDE 9 MG/ML
INJECTION, SOLUTION INTRAVENOUS PRN
Status: DISCONTINUED | OUTPATIENT
Start: 2023-11-20 | End: 2023-11-21 | Stop reason: HOSPADM

## 2023-11-20 RX ORDER — MEPERIDINE HYDROCHLORIDE 25 MG/ML
12.5 INJECTION INTRAMUSCULAR; INTRAVENOUS; SUBCUTANEOUS EVERY 5 MIN PRN
Status: DISCONTINUED | OUTPATIENT
Start: 2023-11-20 | End: 2023-11-20

## 2023-11-20 RX ORDER — MIDAZOLAM HYDROCHLORIDE 2 MG/2ML
2 INJECTION, SOLUTION INTRAMUSCULAR; INTRAVENOUS
Status: DISCONTINUED | OUTPATIENT
Start: 2023-11-20 | End: 2023-11-20

## 2023-11-20 RX ADMIN — PHENYLEPHRINE HYDROCHLORIDE 100 MCG: 10 INJECTION INTRAVENOUS at 17:19

## 2023-11-20 RX ADMIN — SODIUM CHLORIDE 1000 ML: 9 INJECTION, SOLUTION INTRAVENOUS at 02:04

## 2023-11-20 RX ADMIN — SODIUM CHLORIDE, PRESERVATIVE FREE 10 ML: 5 INJECTION INTRAVENOUS at 08:51

## 2023-11-20 RX ADMIN — PROPOFOL 200 MG: 10 INJECTION, EMULSION INTRAVENOUS at 17:12

## 2023-11-20 RX ADMIN — FAMOTIDINE 20 MG: 10 INJECTION, SOLUTION INTRAVENOUS at 22:25

## 2023-11-20 RX ADMIN — HYDROMORPHONE HYDROCHLORIDE 0.5 MG: 1 INJECTION, SOLUTION INTRAMUSCULAR; INTRAVENOUS; SUBCUTANEOUS at 11:36

## 2023-11-20 RX ADMIN — DROPERIDOL 0.62 MG: 2.5 INJECTION, SOLUTION INTRAMUSCULAR; INTRAVENOUS at 01:21

## 2023-11-20 RX ADMIN — PHENYLEPHRINE HYDROCHLORIDE 100 MCG: 10 INJECTION INTRAVENOUS at 17:25

## 2023-11-20 RX ADMIN — KETOROLAC TROMETHAMINE 30 MG: 30 INJECTION, SOLUTION INTRAMUSCULAR; INTRAVENOUS at 03:26

## 2023-11-20 RX ADMIN — MIDAZOLAM 2 MG: 1 INJECTION INTRAMUSCULAR; INTRAVENOUS at 16:55

## 2023-11-20 RX ADMIN — SODIUM CHLORIDE, PRESERVATIVE FREE 10 ML: 5 INJECTION INTRAVENOUS at 21:00

## 2023-11-20 RX ADMIN — PIPERACILLIN AND TAZOBACTAM 4500 MG: 4; .5 INJECTION, POWDER, LYOPHILIZED, FOR SOLUTION INTRAVENOUS at 01:58

## 2023-11-20 RX ADMIN — FENTANYL CITRATE 75 MCG: 50 INJECTION, SOLUTION INTRAMUSCULAR; INTRAVENOUS at 01:55

## 2023-11-20 RX ADMIN — GLYCOPYRROLATE 0.6 MG: 0.2 INJECTION INTRAMUSCULAR; INTRAVENOUS at 18:07

## 2023-11-20 RX ADMIN — FENTANYL CITRATE 50 MCG: 50 INJECTION INTRAMUSCULAR; INTRAVENOUS at 17:12

## 2023-11-20 RX ADMIN — KETOROLAC TROMETHAMINE 30 MG: 30 INJECTION, SOLUTION INTRAMUSCULAR; INTRAVENOUS at 22:44

## 2023-11-20 RX ADMIN — FENTANYL CITRATE 50 MCG: 50 INJECTION INTRAMUSCULAR; INTRAVENOUS at 17:39

## 2023-11-20 RX ADMIN — OXYCODONE 5 MG: 5 TABLET ORAL at 22:09

## 2023-11-20 RX ADMIN — PHENYLEPHRINE HYDROCHLORIDE 100 MCG: 10 INJECTION INTRAVENOUS at 17:21

## 2023-11-20 RX ADMIN — PIPERACILLIN AND TAZOBACTAM 3375 MG: 3; .375 INJECTION, POWDER, LYOPHILIZED, FOR SOLUTION INTRAVENOUS at 08:54

## 2023-11-20 RX ADMIN — POTASSIUM CHLORIDE 10 MEQ: 7.46 INJECTION, SOLUTION INTRAVENOUS at 22:51

## 2023-11-20 RX ADMIN — FENTANYL CITRATE 50 MCG: 50 INJECTION INTRAMUSCULAR; INTRAVENOUS at 17:05

## 2023-11-20 RX ADMIN — DEXAMETHASONE SODIUM PHOSPHATE 4 MG: 4 INJECTION, SOLUTION INTRAMUSCULAR; INTRAVENOUS at 17:24

## 2023-11-20 RX ADMIN — ONDANSETRON 4 MG: 2 INJECTION INTRAMUSCULAR; INTRAVENOUS at 17:57

## 2023-11-20 RX ADMIN — FAMOTIDINE 20 MG: 10 INJECTION, SOLUTION INTRAVENOUS at 08:50

## 2023-11-20 RX ADMIN — SODIUM CHLORIDE: 9 INJECTION, SOLUTION INTRAVENOUS at 03:13

## 2023-11-20 RX ADMIN — IOPAMIDOL 100 ML: 755 INJECTION, SOLUTION INTRAVENOUS at 00:57

## 2023-11-20 RX ADMIN — POTASSIUM CHLORIDE 10 MEQ: 7.46 INJECTION, SOLUTION INTRAVENOUS at 14:29

## 2023-11-20 RX ADMIN — PHENYLEPHRINE HYDROCHLORIDE 100 MCG: 10 INJECTION INTRAVENOUS at 17:34

## 2023-11-20 RX ADMIN — FENTANYL CITRATE 50 MCG: 50 INJECTION INTRAMUSCULAR; INTRAVENOUS at 17:47

## 2023-11-20 RX ADMIN — Medication 5 MG: at 18:07

## 2023-11-20 RX ADMIN — PIPERACILLIN AND TAZOBACTAM 3375 MG: 3; .375 INJECTION, POWDER, LYOPHILIZED, FOR SOLUTION INTRAVENOUS at 16:55

## 2023-11-20 RX ADMIN — ROCURONIUM BROMIDE 30 MG: 50 INJECTION, SOLUTION INTRAVENOUS at 17:13

## 2023-11-20 RX ADMIN — TAMSULOSIN HYDROCHLORIDE 0.4 MG: 0.4 CAPSULE ORAL at 11:32

## 2023-11-20 RX ADMIN — SODIUM CHLORIDE, POTASSIUM CHLORIDE, SODIUM LACTATE AND CALCIUM CHLORIDE: 600; 310; 30; 20 INJECTION, SOLUTION INTRAVENOUS at 22:19

## 2023-11-20 RX ADMIN — PHENYLEPHRINE HYDROCHLORIDE 100 MCG: 10 INJECTION INTRAVENOUS at 17:27

## 2023-11-20 RX ADMIN — SODIUM CHLORIDE, PRESERVATIVE FREE 10 ML: 5 INJECTION INTRAVENOUS at 22:57

## 2023-11-20 RX ADMIN — SODIUM CHLORIDE, POTASSIUM CHLORIDE, SODIUM LACTATE AND CALCIUM CHLORIDE: 600; 310; 30; 20 INJECTION, SOLUTION INTRAVENOUS at 15:23

## 2023-11-20 ASSESSMENT — PAIN - FUNCTIONAL ASSESSMENT
PAIN_FUNCTIONAL_ASSESSMENT: ACTIVITIES ARE NOT PREVENTED
PAIN_FUNCTIONAL_ASSESSMENT: 0-10

## 2023-11-20 ASSESSMENT — PAIN DESCRIPTION - ONSET: ONSET: GRADUAL

## 2023-11-20 ASSESSMENT — PAIN DESCRIPTION - PAIN TYPE: TYPE: ACUTE PAIN

## 2023-11-20 ASSESSMENT — PAIN DESCRIPTION - DESCRIPTORS
DESCRIPTORS: ACHING
DESCRIPTORS: CRAMPING;SORE
DESCRIPTORS: TIGHTNESS
DESCRIPTORS: ACHING;PRESSURE

## 2023-11-20 ASSESSMENT — PAIN DESCRIPTION - ORIENTATION
ORIENTATION: ANTERIOR
ORIENTATION: MID

## 2023-11-20 ASSESSMENT — PAIN DESCRIPTION - FREQUENCY: FREQUENCY: INTERMITTENT

## 2023-11-20 ASSESSMENT — PAIN SCALES - GENERAL
PAINLEVEL_OUTOF10: 8
PAINLEVEL_OUTOF10: 2
PAINLEVEL_OUTOF10: 6
PAINLEVEL_OUTOF10: 7

## 2023-11-20 ASSESSMENT — PAIN DESCRIPTION - LOCATION
LOCATION: ABDOMEN

## 2023-11-20 NOTE — ED TRIAGE NOTES
Pt ambulatory to the ER with c/o abd pain in his lower stomach that started around yesterday afternoon after eating lunch. Shortly thereafter he started getting diarrhea, felt constipated, made his self vomit. Last BM was 2-3 couple of hours ago after using an at home enema. Has not eaten anything but broth since yesterday and has lost 5lbs since yesterday. Took tylenol this afternoon after having a fever of 101.7. Denies having a history of abd related problems.

## 2023-11-20 NOTE — PROGRESS NOTES
TRANSFER - IN REPORT:    Verbal report received from [facility] on [address]  being received from ED for routine progression of patient care      Report consisted of patient's Situation, Background, Assessment and   Recommendations(SBAR). Information from the following report(s) ED SBAR was reviewed with the receiving nurse. Opportunity for questions and clarification was provided. Assessment completed upon patient's arrival to unit and care assumed. Per reporting nurse. Bladder scan completed in ED. Noted 300ml. No orders or intervention in ED. Per RN has not voided.

## 2023-11-20 NOTE — ED NOTES
TRANSFER - OUT REPORT:    Verbal report given to RN on [address]  being transferred to  for routine progression of patient care       Report consisted of patient's Situation, Background, Assessment and   Recommendations(SBAR). Information from the following report(s) Nurse Handoff Report, ED Encounter Summary, ED SBAR, Adult Overview, Intake/Output, MAR, and Recent Results was reviewed with the receiving nurse. [place] Fall Assessment:    Presents to emergency department  because of falls (Syncope, seizure, or loss of consciousness): No  Age > 70: No  Altered Mental Status, Intoxication with alcohol or substance confusion (Disorientation, impaired judgment, poor safety awaremess, or inability to follow instructions): No  Impaired Mobility: Ambulates or transfers with assistive devices or assistance; Unable to ambulate or transer.: No             Lines:   Peripheral IV 11/20/23 Right Antecubital (Active)        Opportunity for questions and clarification was provided.       Patient transported with:  Juan Lugo [place]  11/20/23 0937

## 2023-11-20 NOTE — CARE COORDINATION
CM spoke to patient at bedside on this day. Patient confirmed demographic information. Patient reports that he lives in a 3 level home with his spouse, daughter, and parents. Patient reports that he is independent with ADLs, uses no DMEs, and drives. Patient reports that he has no home health services. Patient confirmed PCP information and stated last PCP visit was over a year ago. Patient is agreeable to return home once medically stable for discharge and patient stated that a family member will transport him home at the appropriate time. No CM needs voiced or noted at this time. CM will continue to follow patient for any discharge planning needs. 11/20/23 1110   Service Assessment   Patient Orientation Alert and Oriented   Cognition Alert   History Provided By Patient   [address] is: Legal Next of [address] [place] [address]   PCP Verified by CM Yes  (confirmed PCP is Dr. Placido Martínez)   Last Visit to PCP Within last two years  (last PCP visit over a year ago)   Prior Functional Level Independent in ADLs/IADLs   Current Functional Level Other (see comment)  (TBD by clinicals)   Can patient return to prior living arrangement Yes   Ability to make needs known: Good   Family able to assist with home care needs: Yes   Would you like for me to discuss the discharge plan with any other family members/significant others, and if so, who?  Yes   Financial Resources Other (Comment)  (BCBS)   Community Resources None   CM/SW Referral Other (see comment)  (discharge planning)   Social/Functional History   Lives With Spouse;Daughter;Parent   Type of Home House   Home Layout Multi-level  (3 levels)   Home Access Stairs to enter with rails   Entrance Stairs - Number of Steps 6-7 steps   Bathroom Shower/Tub Tub/Shower unit   Bathroom Toilet Standard   Bathroom Equipment None   Bathroom Accessibility Accessible   Home Equipment None   Receives Help From Family   ADL Assistance Independent

## 2023-11-20 NOTE — PROGRESS NOTES
Patient arrived to floor. Unable to void s/p attempting upon arrival.Writer telephone called Deann Castillo NP and received VO for mahoney cath placement.

## 2023-11-20 NOTE — ANESTHESIA PROCEDURE NOTES
Airway  Date/Time: 11/20/2023 5:15 PM  Urgency: elective    Airway not difficult    General Information and Staff    Patient location during procedure: OR  Resident/CRNA: Lili Finch, [address]  Performed: resident/CRNA   Performed by: STEPHANIE Lemos - CRNA  Authorized by: Shameka Mcadams MD      Indications and Patient Condition  Indications for airway management: anesthesia  Spontaneous Ventilation: absent  Sedation level: deep  Preoxygenated: yes  Patient position: sniffing  MILS not maintained throughout  Mask difficulty assessment: vent by bag mask    Final Airway Details  Final airway type: endotracheal airway      Successful airway: ETT  Cuffed: yes   Successful intubation technique: direct laryngoscopy  Facilitating devices/methods: intubating stylet  Endotracheal tube insertion site: oral  Blade: Lujan  Blade size: #2  ETT size (mm): 7.5  Cormack-Lehane Classification: grade I - full view of glottis  Placement verified by: chest auscultation and capnometry   Measured from: lips  Number of attempts at approach: 1  Ventilation between attempts: bag mask  Number of other approaches attempted: 0    Additional Comments  Lips and dentition unchanged.    no

## 2023-11-20 NOTE — OP NOTE
infraumbilical location. The abdomen was inspected and noted to be free of injury. Two additional 5 mm ports were placed in the left lower quadrant and in the suprapubic region after instilling 0.25% Marcaine with epi local anesthetic. The patient was placed in Trendelenburg with left side down position. Two bowel graspers were used to move the omentum and small bowel into the left upper quadrant. The appendix was visualized and there was purulence surrounding. The appendix was elevated using a bowel grasper and noted to be intact without evidence of gross perforation. The [place] LigaSure was used to come across the appendiceal mesentery and appendiceal artery with dissection extending to the appendiceal base. A 45 mm [place] MARTINEZ stapler with a blue load was used to divide the appendix at its base. The appendix was placed in an Endo Catch bag and removed via the 12 mm infraumbilical port. The specimen was passed off the field and sent to pathology for routine examination. The abdomen was again inspected and a small amount of pus was evacuated using suction. Meticulous hemostasis was ensured. Next, the fascia of the 12 mm infraumbilical port was closed using a 0 Vicryl suture with a Lv-Marifer fascial closure device. The abdomen was then desufflated and the 5 mm ports were removed. The skin was closed using 4-0 Monocryl. Dermabond was applied. At the end of the procedure all counts were correct. The patient was extubated and transferred to the PACU in stable condition.     Estimated Blood Loss (mL): Minimal    Complications: None    Specimens:   ID Type Source Tests Collected by Time Destination   A : Appendix Tissue Appendix SURGICAL PATHOLOGY Luis F Whitman MD 11/20/2023 2099        Implants:  * No implants in log *      Drains:   Urinary Catheter 11/20/23 [place] (Active)   Catheter Indications Urinary retention (acute or chronic), continuous bladder irrigation or bladder outlet

## 2023-11-20 NOTE — H&P
H&P  London Hurtado  MRN: [id number]  :1990  Age:32 y.o.    HPI: London Hurtado is a 28 y.o. male who general surgery is admitting for acute appendicitis. The patient has a PMHx of anxiety and posterior neck mass (epidermal inclusion cyst). He presented with 23 with acute onset (over the previous 24 hours) lower abdominal pain with associated fever, nausea, vomiting, and urinary retention. Patient with normal  bowel habits until 2023-pm.  He was eating well and having regular bowel movements prior to 2023.  23, Patient ate pizza and thought that possibly pizza was bad because he started experiencing 10/10 lower abdominal pain with nausea and vomiting. He reports emesis = pizza. He denied hematemesis or coffee-ground emesis. He describes lower abdominal pain as constant since onset, aching, sore, and pressure, 10/10 at worst and 6/10 at best. Lower abd pain was not aggravated or alleviated by anything. He has never experienced abdominal pain similar. He tried an enema because he affiliated lower abdominal pain was possible constipation. He had a bowel movement and denied hematochezia, but endorse dark-colored \"black\" loose stools. He started taking stool softeners. He had a fever at home= 101.7 for which she took Tylenol. His wife ate the same pizza and was not ill. He was going to attempt to allow time for improvement in lower abdominal pain and associated symptoms, but he was unable to void and presented to the ED. In the ED 2023, Tmax 100.4. VSS. Urinary catheter was placed. Wbc 13.4, K+ 3.1, T. bili 1.3, lipase 41     CT ABD/PELV 23 revealed:   IMPRESSION:  Impression: Acute appendicitis without abscess. He was admitted by general surgery service on 23 for acute appendicitis. Pt is NPO at time of assessment  Pt is not taking anticoagulation. No previous abdominal surgeries.    Past Medical History:   Diagnosis Date

## 2023-11-20 NOTE — ED PROVIDER NOTES
Emergency Department Provider Note       PCP: Ryan Garcia MD   Age: 28 y.o. Sex: male     Anton Pérez Admitted 11/20/2023 02:16:07 AM       ICD-10-CM    1. Acute appendicitis with generalized peritonitis without gangrene, perforation, or abscess  K35.200           Medical Decision Making     Complexity of Problems Addressed:  1 or more acute illnesses that pose a threat to life or bodily function. Data Reviewed and Analyzed:   I independently ordered and reviewed each unique test.     The patients assessment required an independent historian: wife. The reason they were needed is important historical information not provided by the patient. I interpreted the CT Scan acute appy. Discussion of management or test interpretation. Patient with right lower quadrant pain and elevated white count, creatinine. CT shows acute appendicitis. He was given fluids and antibiotics and reevaluated. Pain is worse and was given fentanyl. Discussed with surgery NP will go to the OR in the morning. Discussed with patient family. Told patient to stay n.p.o. The patient was admitted and I have discussed patient management with the admitting provider. Dr. Lu Thakkar is no on [facility]. Will go to OR in am  Risk of Complications and/or Morbidity of Patient Management:  Prescription drug management performed. Parental controlled substances given in the ED. Discussion with external consultants. Is this patient to be included in the SEP-1 core measure due to severe sepsis or septic shock? No Exclusion criteria - the patient is NOT to be included for SEP-1 Core Measure due to: Alternative explanation for abnormal labs/vitals that do not relate to sepsis, see MDM for further explanation      History      Presents with c/o abd pain, RLQ. Started yesterday after eating pizza he thinks was bad. Constipated/feeling like he had to have a BM so took enema on Sunday morning.   BM after

## 2023-11-20 NOTE — DISCHARGE INSTRUCTIONS
doctor tells you not to. Pat the area dry. Keep the area clean and dry. You may cover it with a gauze bandage if it oozes or rubs against clothing. Change the bandage every day or more often if needed     If your appendix ruptured, you may have an incision with packing in it. Change the packing as often as your doctor tells you to. Packing changes may hurt at first. Taking pain medicine about half an hour before you change the packing can help. If your dressing sticks to your wound, try soaking it with warm water for about 10 minutes before you remove it. You can do this in the shower or by placing a wet washcloth over the dressing. Remove the old packing and rinse the incision with water. Gently pat the top area dry. The size of the incision determines how much gauze you need to put inside. Fold the gauze over once, but do not wad it up so that it hurts. Put it in the wound carefully. You want to keep the sides of the wound from touching. A cotton swab may help you push the gauze in as needed. Put a gauze pad over the wound, and tape it down. You may notice yellow or grayish fluid oozing from your wound as you start to heal. This is normal. It is a sign that your wound is healing. Other instructions    If your appendix ruptured, you may have a tube that drains fluid from the incision. The doctor will tell you how to take care of it. Follow-up care is a key part of your treatment and safety. Be sure to make and go to all appointments, and call your doctor if you are having problems. It's also a good idea to know your test results and keep a list of the medicines you take. When should you call for help? Call 911 anytime you think you may need emergency care. For example, call if:    You passed out (lost consciousness). You are short of breath. Call your doctor now or seek immediate medical care if:    You have nausea or vomiting and cannot drink fluids. You cannot pass stools or gas.

## 2023-11-20 NOTE — PROGRESS NOTES
TRANSFER - OUT REPORT:    Verbal report given to [facility], RN on [address]  being transferred to preop for ordered procedure       Report consisted of patient's Situation, Background, Assessment and   Recommendations(SBAR). Information from the following report(s) Nurse Handoff Report, Adult Overview, Intake/Output, MAR, and Recent Results was reviewed with the receiving nurse. Lines:   Peripheral IV 11/20/23 Right Antecubital (Active)   Site Assessment Clean, dry & intact 11/20/23 0720   Line Status Infusing 11/20/23 0720   Line Care Connections checked and tightened 11/20/23 0720   Phlebitis Assessment No symptoms 11/20/23 0720   Infiltration Assessment 0 11/20/23 0720   Alcohol Cap Used Yes 11/20/23 0720   Dressing Status Clean, dry & intact 11/20/23 0720   Dressing Type Transparent 11/20/23 0720        Opportunity for questions and clarification was provided.       Patient transported with:  [employer]

## 2023-11-21 VITALS
HEIGHT: 68 IN | WEIGHT: 180 LBS | HEART RATE: 63 BPM | BODY MASS INDEX: 27.28 KG/M2 | OXYGEN SATURATION: 94 % | TEMPERATURE: 97.9 F | DIASTOLIC BLOOD PRESSURE: 65 MMHG | RESPIRATION RATE: 17 BRPM | SYSTOLIC BLOOD PRESSURE: 104 MMHG

## 2023-11-21 PROCEDURE — G0378 HOSPITAL OBSERVATION PER HR: HCPCS

## 2023-11-21 PROCEDURE — 6360000002 HC RX W HCPCS: Performed by: SURGERY

## 2023-11-21 PROCEDURE — 2580000003 HC RX 258: Performed by: SURGERY

## 2023-11-21 PROCEDURE — 6370000000 HC RX 637 (ALT 250 FOR IP): Performed by: SURGERY

## 2023-11-21 PROCEDURE — 6370000000 HC RX 637 (ALT 250 FOR IP): Performed by: NURSE PRACTITIONER

## 2023-11-21 PROCEDURE — 96366 THER/PROPH/DIAG IV INF ADDON: CPT

## 2023-11-21 PROCEDURE — 96365 THER/PROPH/DIAG IV INF INIT: CPT

## 2023-11-21 PROCEDURE — 96376 TX/PRO/DX INJ SAME DRUG ADON: CPT

## 2023-11-21 RX ORDER — OXYCODONE HYDROCHLORIDE AND ACETAMINOPHEN 5; 325 MG/1; MG/1
1 TABLET ORAL EVERY 6 HOURS PRN
Qty: 20 TABLET | Refills: 0 | Status: SHIPPED | OUTPATIENT
Start: 2023-11-21 | End: 2023-11-26

## 2023-11-21 RX ORDER — AMOXICILLIN AND CLAVULANATE POTASSIUM 875; 125 MG/1; MG/1
1 TABLET, FILM COATED ORAL 2 TIMES DAILY
Qty: 14 TABLET | Refills: 0 | Status: SHIPPED | OUTPATIENT
Start: 2023-11-21 | End: 2023-11-28

## 2023-11-21 RX ADMIN — POTASSIUM CHLORIDE 10 MEQ: 7.46 INJECTION, SOLUTION INTRAVENOUS at 02:34

## 2023-11-21 RX ADMIN — FAMOTIDINE 20 MG: 20 TABLET, FILM COATED ORAL at 08:40

## 2023-11-21 RX ADMIN — PIPERACILLIN AND TAZOBACTAM 3375 MG: 3; .375 INJECTION, POWDER, LYOPHILIZED, FOR SOLUTION INTRAVENOUS at 08:43

## 2023-11-21 RX ADMIN — TAMSULOSIN HYDROCHLORIDE 0.4 MG: 0.4 CAPSULE ORAL at 08:40

## 2023-11-21 RX ADMIN — SODIUM CHLORIDE: 9 INJECTION, SOLUTION INTRAVENOUS at 08:42

## 2023-11-21 RX ADMIN — IBUPROFEN 400 MG: 400 TABLET, FILM COATED ORAL at 11:25

## 2023-11-21 RX ADMIN — IBUPROFEN 400 MG: 400 TABLET, FILM COATED ORAL at 08:40

## 2023-11-21 RX ADMIN — SODIUM CHLORIDE: 9 INJECTION, SOLUTION INTRAVENOUS at 01:43

## 2023-11-21 RX ADMIN — OXYCODONE 5 MG: 5 TABLET ORAL at 06:17

## 2023-11-21 RX ADMIN — SODIUM CHLORIDE, POTASSIUM CHLORIDE, SODIUM LACTATE AND CALCIUM CHLORIDE: 600; 310; 30; 20 INJECTION, SOLUTION INTRAVENOUS at 06:44

## 2023-11-21 RX ADMIN — POTASSIUM CHLORIDE 10 MEQ: 7.46 INJECTION, SOLUTION INTRAVENOUS at 05:50

## 2023-11-21 RX ADMIN — SODIUM CHLORIDE, PRESERVATIVE FREE 10 ML: 5 INJECTION INTRAVENOUS at 08:40

## 2023-11-21 RX ADMIN — SODIUM CHLORIDE, POTASSIUM CHLORIDE, SODIUM LACTATE AND CALCIUM CHLORIDE: 600; 310; 30; 20 INJECTION, SOLUTION INTRAVENOUS at 01:47

## 2023-11-21 RX ADMIN — OXYCODONE 5 MG: 5 TABLET ORAL at 11:24

## 2023-11-21 RX ADMIN — PIPERACILLIN AND TAZOBACTAM 3375 MG: 3; .375 INJECTION, POWDER, LYOPHILIZED, FOR SOLUTION INTRAVENOUS at 01:46

## 2023-11-21 ASSESSMENT — PAIN DESCRIPTION - ORIENTATION
ORIENTATION: ANTERIOR
ORIENTATION: ANTERIOR

## 2023-11-21 ASSESSMENT — PAIN DESCRIPTION - PAIN TYPE: TYPE: ACUTE PAIN

## 2023-11-21 ASSESSMENT — PAIN DESCRIPTION - FREQUENCY: FREQUENCY: INTERMITTENT

## 2023-11-21 ASSESSMENT — PAIN DESCRIPTION - LOCATION
LOCATION: ABDOMEN
LOCATION: ABDOMEN

## 2023-11-21 ASSESSMENT — PAIN DESCRIPTION - ONSET: ONSET: GRADUAL

## 2023-11-21 ASSESSMENT — PAIN DESCRIPTION - DESCRIPTORS
DESCRIPTORS: ACHING;DISCOMFORT;SHARP;SORE
DESCRIPTORS: ACHING

## 2023-11-21 ASSESSMENT — PAIN - FUNCTIONAL ASSESSMENT: PAIN_FUNCTIONAL_ASSESSMENT: ACTIVITIES ARE NOT PREVENTED

## 2023-11-21 ASSESSMENT — PAIN SCALES - GENERAL
PAINLEVEL_OUTOF10: 5
PAINLEVEL_OUTOF10: 7

## 2023-11-21 NOTE — PROGRESS NOTES
[facility] [address] [place]  Phone: [phone]             November 21, 2023    Patient: Beryle Lane [facility]   YOB: 1990   Date of Visit: 11/19/2023       To Whom It May Concern:    Rashel Diaz was seen and treated in our facility  beginning 11/19/2023 until 11/21/2023. He will have follow up appointment on December 6th, 2023, and is taking narcotics. Please excuse  [facility] from [employer] for the week of December 4th, 2023.       Sincerely,       Carmen Solitario., RN        Signature:__________________________________

## 2023-11-21 NOTE — DISCHARGE SUMMARY
[address]  [phone]   Discharge Summary     Lila Whitney  MRN: [id number]     : 1990     Age: 28 y.o. Admit date: 2023     Discharge date: 2023  Attending Physician: No att. providers found  Primary Discharge Diagnosis:   Principal Problem:    Acute appendicitis  Resolved Problems:    * No resolved hospital problems. *    Primary Operations or Procedures Performed :  Procedure(s):  APPENDECTOMY LAPAROSCOPIC     Brief History and Reason for Admission: [facility] was admitted with the following history of present illness. HPI: Lila Whitney is a 28 y.o. male who general surgery is admitting for acute appendicitis. The patient has a PMHx of anxiety and posterior neck mass (epidermal inclusion cyst). He presented with 23 with acute onset (over the previous 24 hours) lower abdominal pain with associated fever, nausea, vomiting, and urinary retention. Patient with normal  bowel habits until 2023-pm.  He was eating well and having regular bowel movements prior to 2023.  23, Patient ate pizza and thought that possibly pizza was bad because he started experiencing 10/10 lower abdominal pain with nausea and vomiting. He reports emesis = pizza. He denied hematemesis or coffee-ground emesis. He describes lower abdominal pain as constant since onset, aching, sore, and pressure, 10/10 at worst and 6/10 at best. Lower abd pain was not aggravated or alleviated by anything. He has never experienced abdominal pain similar. He tried an enema because he affiliated lower abdominal pain was possible constipation. He had a bowel movement and denied hematochezia, but endorse dark-colored \"black\" loose stools. He started taking stool softeners. He had a fever at home= 101.7 for which she took Tylenol. His wife ate the same pizza and was not ill.   He was

## 2023-11-21 NOTE — PROGRESS NOTES
D/c paperwork reviewed with pt and wife at bedside. PIV removed with no issues. All questions answered. Pt and wife verbalize understanding of all education. Pt informed RN that he is supposed to have [facility] on Dec. 4th, but follow up is for Dec. 6th. Excuse approved by Atha Crigler, NP. RN giving pt excuse at d/c. No further questions at this time. No acute signs of distress.

## 2023-11-21 NOTE — PROGRESS NOTES
TRANSFER - IN REPORT:    Verbal report received from [facility] on [address]  being received from PACU for routine progression of patient care      Report consisted of patient's Situation, Background, Assessment and   Recommendations(SBAR). Information from the following report(s) Surgery Report was reviewed with the receiving nurse. Opportunity for questions and clarification was provided. Assessment completed upon patient's arrival to unit and care assumed.

## 2023-11-21 NOTE — PERIOP NOTE
Patient in stable condition, A&Ox4, not complaining of pain. Pt has sustained several blood pressures with systolics in the 75'A and diastolics in the 45'O. MAPS have maintained greater than 65. Anesthesia MD aware. Patient sent to floor with fluids running. Denies dizziness, nausea, or chest pain.

## 2023-11-21 NOTE — PLAN OF CARE
Problem: Discharge Planning  Goal: Discharge to home or other facility with appropriate resources  Outcome: Progressing     Problem: Pain  Goal: Verbalizes/displays adequate comfort level or baseline comfort level  Outcome: Progressing     Problem: ABCDS Injury Assessment  Goal: Absence of physical injury  Outcome: Progressing     Problem: Safety - Adult  Goal: Free from fall injury  Outcome: Progressing  Flowsheets (Taken 11/21/2023 5672)  Free From Fall Injury: Instruct family/caregiver on patient safety

## 2023-11-27 ENCOUNTER — OFFICE VISIT (OUTPATIENT)
Dept: SURGERY | Age: 33
End: 2023-11-27

## 2023-11-27 VITALS
TEMPERATURE: 97.3 F | SYSTOLIC BLOOD PRESSURE: 122 MMHG | HEIGHT: 68 IN | OXYGEN SATURATION: 90 % | BODY MASS INDEX: 27.28 KG/M2 | DIASTOLIC BLOOD PRESSURE: 78 MMHG | WEIGHT: 180 LBS | HEART RATE: 80 BPM

## 2023-11-27 DIAGNOSIS — L29.0 PERIANAL IRRITATION: Primary | ICD-10-CM

## 2023-11-27 DIAGNOSIS — Z90.49 S/P LAPAROSCOPIC APPENDECTOMY: ICD-10-CM

## 2023-11-27 PROCEDURE — 99024 POSTOP FOLLOW-UP VISIT: CPT | Performed by: SURGERY

## 2023-11-27 NOTE — PROGRESS NOTES
GENERAL SURGERY POSTOPERATIVE FOLLOW UP NOTE:      HISTORY OF PRESENT ILLNESS: Rajeev Hubbard is a 28 y.o. male presents for follow up s/p laparoscopic appendectomy 11/20/2023. Patient describes that he has been having some perianal pain after bowel movements. He describes that it comes on when he sits on the toilet and strains. States that his diarrhea that he had been having the time of the operation is slowly improving and is more solid. He describes approximately 2 episodes of diarrhea per day. He states that when the pain happens it last for about 30 minutes and feels like a sharp stabbing pain in his anus. Denies any hematochezia or melena. He says that the pain in his abdomen has slowly improved over the past week. He does endorse intermittent fevers up to 100.4, but remains afebrile today. He has 2 days of his p.o. Augmentin left and is continue to take probiotics. Additionally, he states that he has been taking MiraLAX and a stool softener despite only taking 1 oxycodone a few days ago. Denies any dizziness, lightheadedness, changes in appetite, hematochezia, melena, chest pain, shortness of breath. He is tolerating his diet without difficulty and drinking approximately 1.5 L of water per day.       REVIEW OF SYSTEMS:   10 Point ROS negative except what is in HPI    PAST MEDICAL HISTORY:    Past Medical History:   Diagnosis Date    Anxiety     Mass of neck     posterior       PAST SURGICAL HISTORY:    Past Surgical History:   Procedure Laterality Date    LAPAROSCOPIC APPENDECTOMY N/A 11/20/2023    APPENDECTOMY LAPAROSCOPIC performed by Ashlee Andujar MD at [facility] MAIN OR       FAMILY HISTORY:    Family History   Problem Relation Age of Onset    No Known Problems Sister     Alcohol Abuse Father     Stroke Paternal Grandfather     Hypertension Father     Diabetes Father     Alzheimer's Disease Paternal Grandmother        SOCIAL HISTORY:    Social History     Socioeconomic

## 2023-11-28 ENCOUNTER — CLINICAL DOCUMENTATION (OUTPATIENT)
Dept: SURGERY | Age: 33
End: 2023-11-28

## 2023-11-28 NOTE — PROGRESS NOTES
TC to patient today to follow up weekend concerns of  constipation and low grade fever. I called pt cell phone # x 2 and home number yesterday with no answer. Re-attempted to call today and patient answered. He went to office to see Dr Beryle Peng yesterday. Note reviewed. He is feeling better today. No concerns. No fever noted. He understands to contact the office for any concerns prior to his next office visit.     Kassie Ash, STEPHANIE - CNP

## 2023-12-11 ENCOUNTER — CLINICAL DOCUMENTATION (OUTPATIENT)
Dept: SURGERY | Age: 33
End: 2023-12-11

## 2023-12-11 RX ORDER — METHOCARBAMOL 750 MG/1
750 TABLET, FILM COATED ORAL 3 TIMES DAILY
Qty: 30 TABLET | Refills: 0 | Status: SHIPPED | OUTPATIENT
Start: 2023-12-11 | End: 2023-12-21

## 2023-12-11 NOTE — PROGRESS NOTES
Spoke with Dr Amos Cox about patient complaints of Left rib pain with deep inspiration only. He is avoiding deep inspiration due to L rib pain. No chest pain, fever or SOB reported. Plan: Will prescribe Robaxin 750mg TID to see if pain is muscular.   If symptoms do no improved over next 24hr or if he develops CP, SOB or fever he was advised to report to the ED.      Claudia Castaneda, APRN - CNP

## 2023-12-14 ENCOUNTER — OFFICE VISIT (OUTPATIENT)
Dept: SURGERY | Age: 33
End: 2023-12-14

## 2023-12-14 VITALS — WEIGHT: 180 LBS | HEIGHT: 68 IN | BODY MASS INDEX: 27.28 KG/M2

## 2023-12-14 DIAGNOSIS — Z09 POSTOPERATIVE EXAMINATION: Primary | ICD-10-CM

## 2023-12-14 PROCEDURE — 99024 POSTOP FOLLOW-UP VISIT: CPT

## (undated) DEVICE — GAUZE,SPONGE,4"X4",16PLY,STRL,LF,10/TRAY: Brand: MEDLINE

## (undated) DEVICE — DRAPE TWL SURG 16X26IN BLU ORB04] ALLCARE INC]

## (undated) DEVICE — MASTISOL ADHESIVE LIQ 2/3ML

## (undated) DEVICE — SUTURE VCRL SZ 3-0 L27IN ABSRB UD L26MM SH 1/2 CIR J416H

## (undated) DEVICE — 2000CC GUARDIAN II: Brand: GUARDIAN

## (undated) DEVICE — SYSTEM FASCIAL CLSR UNIQUE SHLDED WNG SAFE UNIF CONSISTENT

## (undated) DEVICE — RELOAD STPL L45MM H1.5-3.6MM REG TISS BLU GRIPPING SURF B

## (undated) DEVICE — AMD ANTIMICROBIAL GAUZE SPONGES,12 PLY USP TYPE VII, 0.2% POLYHEXAMETHYLENE BIGUANIDE HCI (PHMB): Brand: CURITY

## (undated) DEVICE — REM POLYHESIVE ADULT PATIENT RETURN ELECTRODE: Brand: VALLEYLAB

## (undated) DEVICE — SUTURE MCRYL SZ 4-0 L27IN ABSRB UD L19MM PS-2 1/2 CIR PRIM Y426H

## (undated) DEVICE — PAD,NON-ADHERENT,3X8,STERILE,LF,1/PK: Brand: MEDLINE

## (undated) DEVICE — GARMENT,MEDLINE,DVT,INT,CALF,MED, GEN2: Brand: MEDLINE

## (undated) DEVICE — LIQUIBAND RAPID ADHESIVE 36/CS 0.8ML: Brand: MEDLINE

## (undated) DEVICE — DRAPE,TOP,102X53,STERILE: Brand: MEDLINE

## (undated) DEVICE — PUMP SUC IRR TBNG L10FT W/ HNDPC ASSEMB STRYKEFLOW 2

## (undated) DEVICE — NEEDLE INSUF L120MM ULT VERES ENDOPATH

## (undated) DEVICE — INTENDED FOR TISSUE SEPARATION, AND OTHER PROCEDURES THAT REQUIRE A SHARP SURGICAL BLADE TO PUNCTURE OR CUT.: Brand: BARD-PARKER ® STAINLESS STEEL BLADES

## (undated) DEVICE — TROCAR: Brand: KII FIOS FIRST ENTRY

## (undated) DEVICE — TROCAR: Brand: KII® SLEEVE

## (undated) DEVICE — TROCARS: Brand: KII® BLUNT TIP ACCESS SYSTEM

## (undated) DEVICE — SEALER LAP L37CM MARYLAND JAW OPN NANO COAT MULTIFUNCTIONAL

## (undated) DEVICE — SUTURE SZ 0 27IN 5/8 CIR UR-6  TAPER PT VIOLET ABSRB VICRYL J603H

## (undated) DEVICE — MINOR SPLIT GENERAL: Brand: MEDLINE INDUSTRIES, INC.

## (undated) DEVICE — SHEET, DRAPE, SPLIT, STERILE: Brand: MEDLINE

## (undated) DEVICE — STAPLER ECHELON 3000 45MM STANDARD

## (undated) DEVICE — TRAY PREP DRY W/ PREM GLV 2 APPL 6 SPNG 2 UNDPD 1 OVERWRAP

## (undated) DEVICE — 3M™ TEGADERM™ TRANSPARENT FILM DRESSING FRAME STYLE, 1626W, 4 IN X 4-3/4 IN (10 CM X 12 CM), 50/CT 4CT/CASE: Brand: 3M™ TEGADERM™

## (undated) DEVICE — ANCHOR TISSUE RETRIEVAL SYSTEM, BAG SIZE 125 ML, PORT SIZE 8 MM: Brand: ANCHOR TISSUE RETRIEVAL SYSTEM

## (undated) DEVICE — BUTTON SWITCH PENCIL BLADE ELECTRODE, HOLSTER: Brand: EDGE

## (undated) DEVICE — [HIGH FLOW INSUFFLATOR,  DO NOT USE IF PACKAGE IS DAMAGED,  KEEP DRY,  KEEP AWAY FROM SUNLIGHT,  PROTECT FROM HEAT AND RADIOACTIVE SOURCES.]: Brand: PNEUMOSURE

## (undated) DEVICE — SOLUTION IV 1000ML 0.9% SOD CHL